# Patient Record
(demographics unavailable — no encounter records)

---

## 2017-06-06 NOTE — NUR
PT RESTING IN BED. NO S/S OF ACUTE DISTRESS. ON O2 3L NC. CALL LIGHT WITHIN REACH. SAFETY 
MEASURES ENSURED. WILL CONTINUE TO MONITOR.

## 2017-06-06 NOTE — NUR
RECEIVED PATIENT FROM DAY RN AT BEDSIDE, PATIENT IS RESTING IN BED, AAOX4 ON ROOM AIR, NO 
SOB OR SIGN OF DISTRESS AT THIS TIME, IV TO LEFT AC PATENT AND INTACT, PATIENT DENIES PAIN, 
SKIN INTACT, DISCUSSED PLAN OF CARE WITH PATIENT, PATIENT VERBALIZED UNDERSTANDING, CALL 
LIGHT WITHIN REACH. SAFETY MEASURES CHECKED, CALL LIGHT WITHIN REACH. WILL CONTINUE TO 
MONITOR.

## 2017-06-06 NOTE — NUR
PT SLEEPING IN BED. NO S/S OF ACUTE DISTRESS. CALL LIGHT WITHIN REACH. SAFETY MEASURES 
ENSURED. WILL CONTINUE TO MONITOR.

## 2017-06-06 NOTE — NUR
CHARGE NURSE PERCY RN SPOKE TO ON CALL RESIDENT DR. FLORENTINO REGARDING PATIENT'S BP 
182/140. DOCTOR TO PUT IN NEW ORDERS. WILL FOLLOW UP WITH NEW DOCTOR'S ORDERS.

## 2017-06-06 NOTE — NUR
Patient will be admitted to care of DR ABRAHAM. Admited to TELE.  Will go to room 
ICU #6. Belongings list completed.  Report to EVA LORENZO.

## 2017-06-06 NOTE — NUR
RECEIVED PATIENT FROM ER VIA [facility] WITH CHARGE NURSE CHELSEY RN, DARA RN, AND AURA RT. 
PATIENT IS ABLE TO SELF AMBULATE WITH MINIMAL ASSISTANCE FROM [facility] ONTO ICU BED 6. PATIENT 
IS ON BIPAP WITH SETTINGS OF I/E 10/5, RATE 12, AND FIO2 40%. NO SIGNS OF RESPIRATORY 
DISTRESS OR SOB NOTED. BREATH SOUNDS ARE CLEAR UPON AUSCULTATION AND BOWEL SOUNDS ARE 
PRESENT. THERE IS A #22 IN THE PATIENT'S LEFT ANTECUBITAL. SITE IS DRY, INTACT, AND PATENT. 
PATIENT IS AFEBRILE. MRSA SWAB COLLECTED. ORIENTED PATIENT TO SURROUNDINGS, WITH PATIENT 
VERBALIZED UNDERSTANDING. EXPLAINED TO PATIENT TONIGHT'S PLAN OF CARE IS MEDICATION 
ADMINISTRATION, VITALS Q4H, AND SUPPLEMENTAL OXYGEN DELIVERY VIA BIPAP. PATIENT VERBALIZED 
UNDERSTANDING. HOB AT 30 DEGREES WITH BED IN LOW POSITION. WILL CONTINUE TO MONITOR PATIENT.

## 2017-06-06 NOTE — NUR
SPOKE TO DR. FLORENTINO AND EXPLAINED TO HER PATIENT'S BP /127 AT 0200 AND NO NEW 
ORDERS HAVE BEEN ENTERED. DR. FLORENTINO STATED SHE WILL PUT IN NEW ORDERS. WILL FOLLOW UP 
WITH DOCTOR'S ORDERS.

## 2017-06-06 NOTE — NUR
RECEIVED REPORT FROM NIGHT RN. PT RESTING IN BED. AAOX4. NO S/S OF ACUTE DISTRESS. ON O2 3L 
NC. IV SITE PATENT AND INTACT. CALL LIGHT WITHIN REACH. SAFETY MEASURES ENSURED. WILL 
CONTINUE TO MONITOR.

## 2017-06-06 NOTE — NUR
PT RESTING IN BED. NO S/S OF ACUTE DISTRESS. PT DENIES PAIN. CALL LIGHT WITHIN REACH. SAFETY 
MEASURES ENSURED. WILL CONTINUE TO MONITOR.

## 2017-06-06 NOTE — NUR
RECEIVED PT FROM ICU TRANSFER  FROM [facility]. PT IN STABLE CONDITION. AWAKE,ALERT AND 
ORIENTED X4. ON TELE-ST. BEDREST. IVF ON LT . CLEAR AND INTACT. RT HERE UPON TRANSFER, 
BIPAP OFF, PLACED ON 023L/NC. O2 SAT 99%-1005. /98. NO DISCOMFORT NOR PAIN NOTED. 
PLACED COMFORTABLY IN BED. CALL LIGHT PLACED WITHIN EASY REACH. INSTRUCTED TO CALL IF NEED 
ASSISTANCE. WILL CONTINUE TO MONITOR.

## 2017-06-06 NOTE — NUR
PATIENT RESTING COMFORTABLY IN BED. NO SIGNS OF SOB OR RESPIRATORY DISTRESS NOTED. HOB AT 30 
DEGREES WITH BED IN LOW POSITION. WILL CONTINUE TO MONITOR PATIENT.

## 2017-06-06 NOTE — NUR
REPORT GIVEN TO CHARGE NURSE EVA RN FOR CONTINUITY OF CARE. PATIENT WILL BE TRANSFERRED TO 
MEDICAL-SURGICAL/TELEMETRY UNIT ROOM 105A.

## 2017-06-06 NOTE — NUR
PM MEDS ADMINISTERED, PATIENT TOLERATED WELL, PATIENT RESTING IN BED WITH O2 AT 3L VIA NC, 
CALL LIGHT WITHIN REACH. WILL CONTINUE TO MONITOR

## 2017-06-06 NOTE — NUR
PATIENT RESTING COMFORTABLY IN BED. PATIENT ON BIPAP WITH SETTINGS OF I/E 10/5, RATE 12, AND 
FIO2 40%. NO SIGNS OF SOB NOTED. HOB AT 30 DEGREES WITH BED IN LOW POSITION. CONTINUE TO 
MONITOR PATIENT.

## 2017-06-06 NOTE — NUR
PATIENT HAS BEEN SCREENED AND CATEGORIZED AS MODERATE NUTRITION RISK. PATIENT WILL BE SEEN 
WITHIN 3-5 DAYS OF ADMISSION.



6/8/17-6/10/17



EARLINE OGLESBY RD


-------------------------------------------------------------------------------

Addendum: 06/07/17 at 0853 by Earline Og[address] RD

-------------------------------------------------------------------------------

[facility] CONSULT RECEIVED ON 6/6/17 - 1224 FOR "MALNUTRITION". PATIENT WILL BE SEEN WITHIN 1-2 
DAYS OF ENTERED CONSULT. 



6/6/17-6/7/17



EARLINE OGLESBY RD

## 2017-06-06 NOTE — NUR
PATIENT TRANSFERRED OFF UNIT TO [facility] ROOM 105A WITH HOUSE SUPERVISOR EUSEBIO LORENZO, SAWYER GIRON, 
SRAVAN GONZALEZN, AND GABI LUEVANO.

## 2017-06-06 NOTE — NUR
PAGED AND SPOKE WITH DR MARSHALL REGARDING TEST RESULTS OF PATIENT US ARTERIAL BILATERAL 
LOWER EXTREMITIES, NO NEW ORDERS GIVEN PATIENT IS RECEIVING HEPARIN SQ.

## 2017-06-06 NOTE — NUR
ASSISTED PATIENT ONTO BEDPAN TO VOID. OUTPUT OF 570ML NOTED. PERINEAL CARE PROVIDED. NO 
SIGNS OF RESPIRATORY DISTRESS OR SOB NOTED. WILL CONTINUE TO MONITOR PATIENT.

## 2017-06-06 NOTE — NUR
PATIENT'S BP /127. NO NEW ORDERS ENTERED. PAGED ON CALL FOR DR. SUBRAMANIAN TO FOLLOW UP FOR 
NEW ORDERS REGARDING PATIENT'S BLOOD PRESSURE.

## 2017-06-07 NOTE — NUR
PATIENT SLEEPING, EASILY AWOKEN, BP ELEVATED /109, WILL ADMINISTER MEDICATION PER MD 
ORDER, ALL OTHER VITALS STABLE, PATIENT ON ROOM AIR WITH O2 SAT AT 95%, CALL LIGHT WITHIN 
REACH. WILL CONTINUE TO MONITOR.

## 2017-06-07 NOTE — NUR
RECEIVED PT FROM JET RN PT  Hebrew SPEAKER AAOX4 AMBULATORY  WITH SOB TO  LITTLE EXERTION, 
ON TELMETRY ST INITIAL ASSESSMENT DONE

## 2017-06-07 NOTE — NUR
BLOOD SUGAR 118 NO COVERAGE NEEDED , RECEIVED FROM LAB MRSA NARES + ISOLATION PRECAUTION 
INITIATED.

## 2017-06-07 NOTE — NUR
6/7/17 

RD INITIAL ASSESSMENT COMPLETED



PLEASE REFER TO NUTRITION ASSESSMENT UNDER CARE ACTIVITY FOR ESTIMATED NUTRITIONAL NEEDS. 



1. CHANGE DIET TO 60 G CONSISTENT CARBOHYDRATE, CARDIAC DIET 

2. RD TO FOLLOW-UP 3-5 DAYS; MODERATE RISK



EARLINE MARTINEZ RD

## 2017-06-07 NOTE — NUR
RECEIVED REPORT FROM BONIFACIO LORENZO FOR CONTINUITY OF CARE. PATIENT SLEEPING BUT EASILY AWAKE . 
NO S/S OF RESP DISTRESS NOTED NO COMPLAIN OF PAIN ABLE TO MAKE NEEDS KNOWN . IV LINE LEFT AC 
GAUGE 20 INTACT AND PATENT. IVF INFUSING WELL. PLAN OF CARE DISCUSSED WITH THE PATIENT 
VITALS STABLE WILL CONTINUE TO MONITOR.

## 2017-06-07 NOTE — NUR
DUE MEDS GIVEN TOLERATED WELL . AMBULATE TO BATHROOM NO WEAKNESS NOTED , SELF MORNING CARE 
DONE , ATE BREAKFAST GOOD APPETITE.

## 2017-06-08 NOTE — NUR
WENT OVER D/C PAPERWORK WITH PT AND HER GRANDDAUGHTER. PT VERBALIZED UNDERSTANDING. PT 
SIGNED ALL APPROPRIATE PAPERWORK.

## 2017-06-08 NOTE — NUR
PT IS RESTING IN BED. NO COMPLAINTS AT THIS TIME. CALL LIGHT WITHIN REACH. WILL CONTINUE TO 
MONITOR.

## 2017-06-08 NOTE — NUR
DR. PAYNE GAVE D/C INSTRUCTIONS AT BEDSIDE USING TELEPHONE TRANSLATION. RECHECKED BP 
147/80, PULSE 96. PT STATED DAUGHTER WILL BE PICKING HER UP BETWEEN 2-3. WILL PLAN DC AND 
CONTINUE TO MONITOR.

## 2017-06-08 NOTE — NUR
RECEIVED REPORT FROM NIGHT SHIFT NURSE AT BEDSIDE. PT IS ALERT AWAKE AND ORIENTED. 
INTRODUCED MYSELF AND UPDATED THE BOARD. PT HAS IV ON L AC 18 SL. PT C/O CHEST PAIN AND 
HEADACHE. REFUSED PAIN MED. WILL ADMINISTERED LASIX ONE TIME ORDER. /99, PULSE 92. 
CALL LIGHT WITHIN REACH. WILL CONTINUE TO MONITOR.

## 2017-06-08 NOTE — NUR
PT REFUSED WHEELCHAIR. WALKED WITH PT TO CAR, ACCOMPANIED BY DAUGHTER AND GRANDDAUGHTER. IV 
REMOVED CANNULA INTACT, ALL BANDS REMOVED. PT IN STABLE CONDITION.